# Patient Record
Sex: FEMALE | Race: WHITE | Employment: FULL TIME | URBAN - NONMETROPOLITAN AREA
[De-identification: names, ages, dates, MRNs, and addresses within clinical notes are randomized per-mention and may not be internally consistent; named-entity substitution may affect disease eponyms.]

---

## 2017-02-27 ENCOUNTER — OFFICE VISIT (OUTPATIENT)
Age: 57
End: 2017-02-27

## 2017-02-27 VITALS
BODY MASS INDEX: 33.75 KG/M2 | TEMPERATURE: 97.3 F | DIASTOLIC BLOOD PRESSURE: 74 MMHG | HEIGHT: 66 IN | HEART RATE: 92 BPM | RESPIRATION RATE: 18 BRPM | OXYGEN SATURATION: 96 % | WEIGHT: 210 LBS | SYSTOLIC BLOOD PRESSURE: 124 MMHG

## 2017-02-27 DIAGNOSIS — I89.0 LYMPHEDEMA OF LEFT UPPER EXTREMITY: Primary | ICD-10-CM

## 2017-02-27 DIAGNOSIS — C50.412 MALIGNANT NEOPLASM OF UPPER-OUTER QUADRANT OF LEFT FEMALE BREAST (HCC): ICD-10-CM

## 2017-02-27 PROCEDURE — 99214 OFFICE O/P EST MOD 30 MIN: CPT | Performed by: SURGERY

## 2018-01-10 ENCOUNTER — HOSPITAL ENCOUNTER (OUTPATIENT)
Dept: MAMMOGRAPHY | Age: 58
Discharge: HOME | End: 2018-01-10
Payer: COMMERCIAL

## 2018-01-10 DIAGNOSIS — Z12.39 SCREENING BREAST EXAMINATION: ICD-10-CM

## 2018-01-10 PROCEDURE — 77063 BREAST TOMOSYNTHESIS BI: CPT

## 2019-01-29 ENCOUNTER — HOSPITAL ENCOUNTER (OUTPATIENT)
Dept: MAMMOGRAPHY | Age: 59
Discharge: HOME | End: 2019-01-29
Payer: COMMERCIAL

## 2019-01-29 DIAGNOSIS — Z12.39 SCREENING BREAST EXAMINATION: ICD-10-CM

## 2019-01-29 PROCEDURE — 77067 SCR MAMMO BI INCL CAD: CPT

## 2019-01-31 ENCOUNTER — HOSPITAL ENCOUNTER (OUTPATIENT)
Dept: WOMENS IMAGING | Age: 59
Discharge: HOME | End: 2019-01-31
Payer: COMMERCIAL

## 2019-01-31 DIAGNOSIS — R92.1 BREAST CALCIFICATIONS: ICD-10-CM

## 2019-01-31 PROCEDURE — G0279 TOMOSYNTHESIS, MAMMO: HCPCS

## 2019-08-06 ENCOUNTER — HOSPITAL ENCOUNTER (OUTPATIENT)
Dept: WOMENS IMAGING | Age: 59
Discharge: HOME | End: 2019-08-06
Payer: COMMERCIAL

## 2019-08-06 DIAGNOSIS — R92.1 BREAST CALCIFICATIONS: ICD-10-CM

## 2019-08-06 PROCEDURE — G0279 TOMOSYNTHESIS, MAMMO: HCPCS

## 2022-09-28 ENCOUNTER — HOSPITAL ENCOUNTER (OUTPATIENT)
Dept: WOMENS IMAGING | Age: 62
Discharge: HOME | End: 2022-09-28

## 2022-09-28 DIAGNOSIS — Z12.31 VISIT FOR SCREENING MAMMOGRAM: ICD-10-CM

## 2022-09-28 PROCEDURE — 77067 SCR MAMMO BI INCL CAD: CPT

## 2022-10-10 ENCOUNTER — HOSPITAL ENCOUNTER (OUTPATIENT)
Dept: MRI IMAGING | Age: 62
Discharge: HOME | End: 2022-10-10

## 2022-10-10 ENCOUNTER — HOSPITAL ENCOUNTER (OUTPATIENT)
Dept: WOMENS IMAGING | Age: 62
Discharge: HOME | End: 2022-10-10

## 2022-10-10 DIAGNOSIS — Z00.6 ENCOUNTER FOR EXAMINATION FOR NORMAL COMPARISON OR CONTROL IN CLINICAL RESEARCH PROGRAM: ICD-10-CM

## 2023-05-11 ENCOUNTER — HOSPITAL ENCOUNTER (OUTPATIENT)
Dept: AUDIOLOGY | Age: 63
Discharge: HOME | End: 2023-05-11
Payer: COMMERCIAL

## 2023-05-11 PROCEDURE — 92567 TYMPANOMETRY: CPT | Performed by: AUDIOLOGIST

## 2023-05-11 PROCEDURE — 92557 COMPREHENSIVE HEARING TEST: CPT | Performed by: AUDIOLOGIST

## 2023-05-11 NOTE — PROGRESS NOTES
AUDIOLOGICAL EVALUATION      REASON FOR TESTING:  Self referral due to plugged sensation for the right ear. The patient reports waking up with a plugged sensation in her right ear on 4/30/23. She explains that she had a sinus infection a few weeks prior. She notes an echoing/muffled sound quality of her own voices, but denies any otalgia, hearing loss, tinnitus and vertigo. The patient saw her PCP who prescribed a steroid. The patient explains that her symptoms did not improve. She is taking a second round of steroids. OTOSCOPY: Tympanic membrane for the right ear appears to be retracted. Partially occluding cerumen of the external auditory canal of the left ear. 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 Normal hearing sensitivity sloping to severe high frequency sensorineural hearing loss for the right ear. Word recognition ability is excellent at 100% for both ears. Tympanometry revealed normal peak pressure and normal middle ear compliance for the left ear. Tympanometry revealed negative middle ear pressure (-95 daPa) with normal middle ear compliance for the right ear. RECOMMENDATION(S):   1- Continue care with Dr. Jono Villanueva, as scheduled. 2- Repeat audiogram and tympanogram following any medical intervention. 3- Annual audiometry is recommended as well for monitoring high frequency hearing loss. 4- Debrox drops/flushing with bulb syringe was suggests for cerumen removal for the left ear, given that a low frequency conductive component was noted.

## 2023-08-17 ENCOUNTER — HOSPITAL ENCOUNTER (OUTPATIENT)
Dept: MAMMOGRAPHY | Age: 63
Discharge: HOME | End: 2023-08-17
Payer: COMMERCIAL

## 2023-08-17 VITALS — BODY MASS INDEX: 36.16 KG/M2 | WEIGHT: 225 LBS | HEIGHT: 66 IN

## 2023-08-17 DIAGNOSIS — Z12.39 BREAST SCREENING: ICD-10-CM

## 2023-08-17 PROCEDURE — 77067 SCR MAMMO BI INCL CAD: CPT

## 2024-06-13 ENCOUNTER — HOSPITAL ENCOUNTER (OUTPATIENT)
Dept: AUDIOLOGY | Age: 64
Discharge: HOME | End: 2024-06-13
Payer: MEDICARE

## 2024-06-13 PROCEDURE — 92567 TYMPANOMETRY: CPT | Performed by: AUDIOLOGIST

## 2024-06-13 PROCEDURE — 92557 COMPREHENSIVE HEARING TEST: CPT | Performed by: AUDIOLOGIST

## 2024-06-13 NOTE — PROGRESS NOTES
AUDIOLOGICAL EVALUATION      REASON FOR TESTING:  Self referral for annual audiometry due to bilateral sensorineural hearing loss and plugged sensation for the right ear. The patient reports waking up with a plugged sensation in her right ear on 4/30/23. She had a sinus infection a few weeks prior. She continues to experiences an intermittent plugged sensation in the right ear. She denies any otalgia, hearing loss, tinnitus and vertigo. 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 Word recognition ability was excellent at 100% for both ears. Tympanometry revealed normal peak pressure and normal middle ear compliance for the left ear. Tympanometry revealed negative middle ear pressure (-95 daPa) with normal middle ear compliance for the right ear.     OTOSCOPY: Clear canal with normal appearing tympanic membrane for both ears.     AUDIOGRAM        Reliability: Good    COMMENTS: Pure tone audiometry revealed normal hearing sensitivity sloping to moderately-severe high frequency sensorineural hearing loss for both ears.  Word recognition ability is excellent at 100%, bilaterally. Tympanometry revealed normal peak pressure and normal middle ear compliance for both ears. Mostly stable thresholds for both ears, relative to 5/11/23 audiogram.      RECOMMENDATION(S):   1- ENT consult could be considered if intermittent plugged sensation continues.  2- Today's results were reviewed with the patient and her . Binaural amplification is recommended. The patient does not express interest in pursuing amplification at this time.  3- Repeat audiogram annually for monitoring purposes. Testing should be completed sooner if any changes are noted in hearing ability.     AUDIOGRAM COMPLETED 5/11/23:   AUDIOGRAM

## 2024-06-14 ENCOUNTER — TELEPHONE (OUTPATIENT)
Dept: AUDIOLOGY | Age: 64
End: 2024-06-14

## 2024-08-20 ENCOUNTER — HOSPITAL ENCOUNTER (OUTPATIENT)
Dept: MAMMOGRAPHY | Age: 64
Discharge: HOME | End: 2024-08-20
Payer: MEDICARE

## 2024-08-20 VITALS — WEIGHT: 227 LBS | BODY MASS INDEX: 36.48 KG/M2 | HEIGHT: 66 IN

## 2024-08-20 DIAGNOSIS — Z12.39 BREAST SCREENING: ICD-10-CM

## 2024-08-20 PROCEDURE — 77063 BREAST TOMOSYNTHESIS BI: CPT

## 2025-06-16 ENCOUNTER — HOSPITAL ENCOUNTER (OUTPATIENT)
Dept: AUDIOLOGY | Age: 65
Discharge: HOME | End: 2025-06-16
Payer: MEDICARE

## 2025-06-16 PROCEDURE — 92557 COMPREHENSIVE HEARING TEST: CPT | Performed by: AUDIOLOGIST

## 2025-06-16 NOTE — PROGRESS NOTES
AUDIOLOGICAL EVALUATION      REASON FOR TESTING:  Audiometric evaluation per the request of Dr. Lucia, due to the diagnosis of unspecified hearing loss. The patient does not report any change in hearing. She denies any communication difficulties. Occasional plugged sensation in her right ear when she exercises. She denies any otalgia, tinnitus and vertigo. History of a plugged sensation in her right ear on 4/30/23- she he had a sinus infection a few weeks prior.      Previous results on 6/13/24: Pure tone audiometry revealed normal hearing sensitivity sloping to moderately-severe high frequency sensorineural hearing loss for both ears.  Word recognition ability is excellent at 100%, bilaterally. Tympanometry revealed normal peak pressure and normal middle ear compliance for both ears. Mostly stable thresholds for both ears, relative to 5/11/23 audiogram.     OTOSCOPY: Clear canal with normal appearing tympanic membrane for both ears.       AUDIOGRAM        Reliability: Good    COMMENTS: Pure tone audiometry revealed normal hearing sensitivity sloping to moderately-severe sensorineural hearing loss for both ears.  Word recognition ability is excellent at 100% for the right ear and good at 88% for the left ear. Thresholds are relatively stable for both ears when compared to 6/13/24 audiometry.      RECOMMENDATION(S):   1- Today's results were reviewed with the patient. Binaural amplification is recommended. The patient does not express interest in pursuing amplification at this time.  2- Repeat audiogram annually for monitoring purposes. Testing should be completed sooner if any changes are noted in hearing ability. Audiometry is scheduled for 6/15/26.      PREVIOUS AUDIOGRAM COMPLETED 6/13/24:      PREVIOUS AUDIOGRAM COMPLETED 5/11/23:

## 2025-08-27 ENCOUNTER — HOSPITAL ENCOUNTER (OUTPATIENT)
Dept: MAMMOGRAPHY | Age: 65
Discharge: HOME | End: 2025-08-27
Payer: MEDICARE

## 2025-08-27 DIAGNOSIS — Z12.39 SCREENING BREAST EXAMINATION: ICD-10-CM

## 2025-08-27 PROCEDURE — 77063 BREAST TOMOSYNTHESIS BI: CPT

## 2025-08-31 ENCOUNTER — HOSPITAL ENCOUNTER (EMERGENCY)
Age: 65
Discharge: HOME | End: 2025-08-31
Attending: EMERGENCY MEDICINE
Payer: MEDICARE

## 2025-08-31 ENCOUNTER — APPOINTMENT (OUTPATIENT)
Dept: GENERAL RADIOLOGY | Age: 65
End: 2025-08-31
Payer: MEDICARE

## 2025-08-31 VITALS
SYSTOLIC BLOOD PRESSURE: 121 MMHG | TEMPERATURE: 97.7 F | HEIGHT: 66 IN | OXYGEN SATURATION: 97 % | WEIGHT: 215 LBS | DIASTOLIC BLOOD PRESSURE: 63 MMHG | RESPIRATION RATE: 18 BRPM | BODY MASS INDEX: 34.55 KG/M2 | HEART RATE: 103 BPM

## 2025-08-31 DIAGNOSIS — R10.31 RIGHT INGUINAL PAIN: Primary | ICD-10-CM

## 2025-08-31 PROCEDURE — 73502 X-RAY EXAM HIP UNI 2-3 VIEWS: CPT

## 2025-08-31 PROCEDURE — 99283 EMERGENCY DEPT VISIT LOW MDM: CPT

## 2025-08-31 RX ORDER — LORAZEPAM 0.5 MG/1
0.5 TABLET ORAL EVERY 6 HOURS PRN
COMMUNITY

## 2025-08-31 RX ORDER — MAGNESIUM GLUCONATE 27 MG(500)
500 TABLET ORAL 2 TIMES DAILY
COMMUNITY

## 2025-08-31 ASSESSMENT — PAIN SCALES - GENERAL: PAINLEVEL_OUTOF10: 2

## 2025-08-31 ASSESSMENT — PAIN - FUNCTIONAL ASSESSMENT: PAIN_FUNCTIONAL_ASSESSMENT: 0-10
